# Patient Record
Sex: MALE | Race: WHITE | Employment: FULL TIME | URBAN - METROPOLITAN AREA
[De-identification: names, ages, dates, MRNs, and addresses within clinical notes are randomized per-mention and may not be internally consistent; named-entity substitution may affect disease eponyms.]

---

## 2017-04-20 ENCOUNTER — OFFICE VISIT (OUTPATIENT)
Dept: SURGERY | Facility: CLINIC | Age: 52
End: 2017-04-20

## 2017-04-20 VITALS
WEIGHT: 221.6 LBS | OXYGEN SATURATION: 96 % | BODY MASS INDEX: 32.82 KG/M2 | SYSTOLIC BLOOD PRESSURE: 129 MMHG | HEART RATE: 68 BPM | DIASTOLIC BLOOD PRESSURE: 86 MMHG | HEIGHT: 69 IN

## 2017-04-20 DIAGNOSIS — K62.82 ANAL DYSPLASIA: Primary | ICD-10-CM

## 2017-04-20 ASSESSMENT — PAIN SCALES - GENERAL: PAINLEVEL: NO PAIN (0)

## 2017-04-20 NOTE — LETTER
2017      RE: Gregorio Porter  [address]  [facility] [address]         Colon and Rectal Surgery Clinic High Resolution Anoscopy Note    RE: Gregorio Porter  : 1965  DARLENE: 2017      Gregorio Porter is a 51 year old HIV positive male with a history of ASCUS on anal cytology and HRA on 3/24/2016 showing AIN 1. Last anal cytology was 2015 and showed ASCUS. He presents today for repeat high resolution anoscopy.    HPI: Gregorio states that his HIV is under good control. He continues to smoke 2-3 packs per day but just recently talked to his PCP about getting patches and chantix. He plans on quitting now.   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 His last procedure was a few years ago and he had hemorrhoidectomy at the same time. He does frequently have bright red bleeding with bowel movements and attributes this to current hemorrhoids. He states that his bowel movements are variable with his HIV meds. He will have several days of constipation and then diarrhea for a day.   He had a colonoscopy in 2015 with the removal of 17 polyps at that time and a follow up colonoscopy with 2 additional polyps removed. His mother had colon cancer in her 60s and his father had ulcerative colitis with a colectomy at age 47. [facility] He was told that he is not due for a colonoscopy until 2018.    ASSESSMENT: Written, informed consent was obtained prior to procedure.  Prior to the start of the procedure and with procedural staff participation, I verbally confirmed the patient s identity using two indicators, relevant allergies, that the procedure was appropriate and matched the consent or emergent situation, and that the correct equipment/implants were available. Immediately prior to starting the procedure I conducted the Time Out with the procedural staff and  re-confirmed the patient s name, procedure, and site/side. (The Joint Commission universal protocol was followed.)  Yes    Sedation (Moderate or Deep): None    Anal cytology was obtained today. Digital anal rectal exam was performed without any lesions palpable. Dilute acetic acid soak was completed for 2 minutes. Lubricant was used to insert the anoscope. Performed high resolution microscopy using the colposcope. The dentate line was viewed in its entirety. Abnormal areas noted was one area of thickened tissue with bright acetowhitening in the left posterior position. After injection with 1% lidocaine with epinephrine, biopsy were obtained using a baby Tischler forceps at this site. Fulguration was not performed and hemostasis was obtained using Monsel solution. The remainder of the anal canal had some diffuse mild acetowhitening with some microvascular abnormalities but no coarse punctation. He additionally has a large, prolapsing hemorrhoid in the left lateral position.  The perianal area was inspected after acetic acid soak. The findings noted were scarring in the posterior midline and moderate external skin tags. There was an area of hyperpigmentation in the right lateral position.   There was a small distinct area of bright acetowhitening in the right lateral position and a small, raised mole on the right buttock with some punctation. After injection with 1% lidocaine with epinephrine, biopsies were obtained at both of these sites and they were fulgurated completely.  The patient tolerated the procedures well.    PLAN: Gregorio Porter is a pleasant 50 year old HIV positive male with a history of ASCUS on anal cytology and AIN 1. Will follow up with patient with results of anal cytology and pathology from today. If low grade dysplasia, repeat HRA in 6 months. If high grade dysplasia in the anal canal, would recommend HRA in the OR with repeat HRA in clinic 3 months following that. If high grade dysplasia  externally, both sites were destroyed completely with fulguration today.  Advised him to continue to follow with his colorectal surgeon for management of multiple polyps and I recommended a repeat colonoscopy this year. Also advised [facility] as he may respond well to hemorrhoid banding.  I congratulated him on planned smoking cessation.     For details of past medical history, surgical history, family history, medications, allergies, and review of systems, please see details below.    Medical history:  No past medical history on file.    Surgical history:  No past surgical history on file.    Family history:  No family history on file.    Medications:  Current Outpatient Prescriptions   Medication Sig Dispense Refill     Ritonavir (NORVIR PO) Take by mouth daily       loperamide (IMODIUM) 2 MG capsule Take 2 mg by mouth daily       HYDRALAZINE-HCTZ PO        RANITIDINE HCL PO        BUPROPION HCL PO        MIRTAZAPINE PO Take by mouth At Bedtime       ATAZANAVIR SULFATE PO        emtricitabine-tenofovir (TRUVADA) 200-300 MG per tablet Take 1 tablet by mouth daily       Allergies:  The patienthas No Known Allergies.    Social history:  Social History   Substance Use Topics     Smoking status: Current Every Day Smoker     Packs/day: 2.00     Types: Cigarettes     Smokeless tobacco: Not on file     Alcohol use Yes      Comment: 5-6 beers a week     Marital status: single.    Review of Systems:  There are no exam notes on file for this visit.     This procedure was performed under a collaborative agreement with Dr. Deep Rock MD, Chief of Colon and Rectal Surgery, [facility].    CELINE Valladares  Colon and Rectal Surgery  [facility]          GUICHO Valladares CNP

## 2017-04-20 NOTE — MR AVS SNAPSHOT
"              After Visit Summary   2017    Gregorio Porter    MRN: [id number]           Patient Information     Date Of Birth          1965        Visit Information        Provider Department      2017 3:00 PM Theresa Lu APRN [facility] Colon and Rectal Surgery        Today's Diagnoses     Anal dysplasia    -  1       Follow-ups after your visit        Who to contact     Please call your clinic at [phone] to:    Ask questions about your health    Make or cancel appointments    Discuss your medicines    Learn about your test results    Speak to your doctor   If you have compliments or concerns about an experience at your clinic, or if you wish to file a complaint, please contact [facility] Patient Relations at [phone] or email us at [email or web address]         Additional Information About Your Visit        MyChart Information     Motivappst is an electronic gateway that provides easy, online access to your medical records. With OneMorePallet, you can request a clinic appointment, read your test results, renew a prescription or communicate with your care team.     To sign up for Motivappst visit the website at [email or web address]   You will be asked to enter the access code listed below, as well as some personal information. Please follow the directions to create your username and password.     Your access code is: [email or web address]  Expires: 2017  6:30 AM     Your access code will  in 90 days. If you need help or a new code, please contact your [facility] Clinic or call [phone] for assistance.        Care EveryWhere ID     This is your Care EveryWhere ID. This could be used by other organizations to access your [facility] medical records  [id number]        Your Vitals Were     Pulse Height Pulse Oximetry BMI (Body Mass Index)          68 5' 9\" 96% 32.72 kg/m2         Blood Pressure from Last 3 " Encounters:   04/20/17 129/86   03/24/16 121/81   08/13/15 130/83    Weight from Last 3 Encounters:   04/20/17 221 lb 9.6 oz   03/24/16 235 lb 1.6 oz   08/13/15 236 lb 8 oz              We Performed the Following     Cytology non gyn (Anal PAP)     Surgical pathology exam        Primary Care Provider Office Phone # Fax #    Mike Rios [phone]       [facility] [address] [facility] [id number]        Thank you!     Thank you for choosing [facility] COLON AND RECTAL SURGERY  for your care. Our goal is always to provide you with excellent care. Hearing back from our patients is one way we can continue to improve our services. Please take a few minutes to complete the written survey that you may receive in the mail after your visit with us. Thank you!             Your Updated Medication List - Protect others around you: Learn how to safely use, store and throw away your medicines at www.disposemymeds.org.          This list is accurate as of: 4/20/17  4:00 PM.  Always use your most recent med list.                   Brand Name Dispense Instructions for use    ATAZANAVIR SULFATE PO          BUPROPION HCL PO          emtricitabine-tenofovir 200-300 MG per tablet    TRUVADA     Take 1 tablet by mouth daily       HYDRALAZINE-HCTZ PO          loperamide 2 MG capsule    IMODIUM     Take 2 mg by mouth daily       MIRTAZAPINE PO      Take by mouth At Bedtime       NORVIR PO      Take by mouth daily       RANITIDINE HCL PO

## 2017-04-20 NOTE — NURSING NOTE
"Chief Complaint   Patient presents with     Clinic Care Coordination - Follow-up     return       Vitals:    04/20/17 1456   BP: 129/86   Pulse: 68   SpO2: 96%   Weight: 221 lb 9.6 oz   Height: 5' 9\"       Body mass index is 32.72 kg/(m^2).    Karen SESAY LPN                          "

## 2017-04-20 NOTE — PROGRESS NOTES
Colon and Rectal Surgery Clinic High Resolution Anoscopy Note    RE: Gregorio Porter  : 1965  DARLENE: 2017      Gregorio Porter is a 51 year old HIV positive male with a history of ASCUS on anal cytology and HRA on 3/24/2016 showing AIN 1. Last anal cytology was 2015 and showed ASCUS. He presents today for repeat high resolution anoscopy.    HPI: Gregorio states that his HIV is under good control. He continues to smoke 2-3 packs per day but just recently talked to his PCP about getting patches and chantix. He plans on quitting now.   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 His last procedure was a few years ago and he had hemorrhoidectomy at the same time. He does frequently have bright red bleeding with bowel movements and attributes this to current hemorrhoids. He states that his bowel movements are variable with his HIV meds. He will have several days of constipation and then diarrhea for a day.   He had a colonoscopy in 2015 with the removal of 17 polyps at that time and a follow up colonoscopy with 2 additional polyps removed. His mother had colon cancer in her 60s and his father had ulcerative colitis with a colectomy at age 47. [facility] He was told that he is not due for a colonoscopy until 2018.    ASSESSMENT: Written, informed consent was obtained prior to procedure.  Prior to the start of the procedure and with procedural staff participation, I verbally confirmed the patient s identity using two indicators, relevant allergies, that the procedure was appropriate and matched the consent or emergent situation, and that the correct equipment/implants were available. Immediately prior to starting the procedure I conducted the Time Out with the procedural staff and re-confirmed the patient s name, procedure, and site/side. (The Joint Commission  universal protocol was followed.)  Yes    Sedation (Moderate or Deep): None    Anal cytology was obtained today. Digital anal rectal exam was performed without any lesions palpable. Dilute acetic acid soak was completed for 2 minutes. Lubricant was used to insert the anoscope. Performed high resolution microscopy using the colposcope. The dentate line was viewed in its entirety. Abnormal areas noted was one area of thickened tissue with bright acetowhitening in the left posterior position. After injection with 1% lidocaine with epinephrine, biopsy were obtained using a baby Tischler forceps at this site. Fulguration was not performed and hemostasis was obtained using Monsel solution. The remainder of the anal canal had some diffuse mild acetowhitening with some microvascular abnormalities but no coarse punctation. He additionally has a large, prolapsing hemorrhoid in the left lateral position.  The perianal area was inspected after acetic acid soak. The findings noted were scarring in the posterior midline and moderate external skin tags. There was an area of hyperpigmentation in the right lateral position.   There was a small distinct area of bright acetowhitening in the right lateral position and a small, raised mole on the right buttock with some punctation. After injection with 1% lidocaine with epinephrine, biopsies were obtained at both of these sites and they were fulgurated completely.  The patient tolerated the procedures well.    PLAN: Gregroio Porter is a pleasant 50 year old HIV positive male with a history of ASCUS on anal cytology and AIN 1. Will follow up with patient with results of anal cytology and pathology from today. If low grade dysplasia, repeat HRA in 6 months. If high grade dysplasia in the anal canal, would recommend HRA in the OR with repeat HRA in clinic 3 months following that. If high grade dysplasia externally, both sites were destroyed completely with fulguration today.  Advised him to  continue to follow with his colorectal surgeon for management of multiple polyps and I recommended a repeat colonoscopy this year. Also advised [facility] as he may respond well to hemorrhoid banding.  I congratulated him on planned smoking cessation.     For details of past medical history, surgical history, family history, medications, allergies, and review of systems, please see details below.    Medical history:  No past medical history on file.    Surgical history:  No past surgical history on file.    Family history:  No family history on file.    Medications:  Current Outpatient Prescriptions   Medication Sig Dispense Refill     Ritonavir (NORVIR PO) Take by mouth daily       loperamide (IMODIUM) 2 MG capsule Take 2 mg by mouth daily       HYDRALAZINE-HCTZ PO        RANITIDINE HCL PO        BUPROPION HCL PO        MIRTAZAPINE PO Take by mouth At Bedtime       ATAZANAVIR SULFATE PO        emtricitabine-tenofovir (TRUVADA) 200-300 MG per tablet Take 1 tablet by mouth daily       Allergies:  The patienthas No Known Allergies.    Social history:  Social History   Substance Use Topics     Smoking status: Current Every Day Smoker     Packs/day: 2.00     Types: Cigarettes     Smokeless tobacco: Not on file     Alcohol use Yes      Comment: 5-6 beers a week     Marital status: single.    Review of Systems:  There are no exam notes on file for this visit.     This procedure was performed under a collaborative agreement with Dr. Deep Rock MD, Chief of Colon and Rectal Surgery, [facility].    Theresa Brand NP-C  Colon and Rectal Surgery  [facility]

## 2017-04-21 LAB — COPATH REPORT: NORMAL

## 2017-04-24 ENCOUNTER — TELEPHONE (OUTPATIENT)
Dept: SURGERY | Facility: CLINIC | Age: 52
End: 2017-04-24

## 2017-04-24 LAB — COPATH REPORT: NORMAL

## 2017-04-24 NOTE — TELEPHONE ENCOUNTER
Called to discuss biopsy results showing AIN 1. Recommend repeat HRA in 6 months. Left message.    GUICHO Valladares, NP-C  Colon and Rectal Surgery  [facility]

## 2017-09-25 ENCOUNTER — TELEPHONE (OUTPATIENT)
Dept: SURGERY | Facility: CLINIC | Age: 52
End: 2017-09-25

## 2017-09-25 NOTE — TELEPHONE ENCOUNTER
Left VM for pt that I faxed a request last week to the [facility] an [facility] on [facility] for follow-up (anal microscopy) appcheryl w/ Theresa Brand (Colorectal). I will call him as soon as I get a new authorization and to call me if he hears from the [facility] also.   Makeda [phone]

## 2017-11-29 ENCOUNTER — TELEPHONE (OUTPATIENT)
Dept: SURGERY | Facility: CLINIC | Age: 52
End: 2017-11-29

## 2017-11-30 ENCOUNTER — OFFICE VISIT (OUTPATIENT)
Dept: SURGERY | Facility: CLINIC | Age: 52
End: 2017-11-30

## 2017-11-30 VITALS
TEMPERATURE: 98.3 F | OXYGEN SATURATION: 95 % | HEIGHT: 69 IN | DIASTOLIC BLOOD PRESSURE: 80 MMHG | HEART RATE: 72 BPM | SYSTOLIC BLOOD PRESSURE: 140 MMHG | BODY MASS INDEX: 34.52 KG/M2 | WEIGHT: 233.1 LBS

## 2017-11-30 DIAGNOSIS — K62.82 ANAL DYSPLASIA: Primary | ICD-10-CM

## 2017-11-30 RX ORDER — LORATADINE 10 MG/1
10 TABLET ORAL DAILY
COMMUNITY

## 2017-11-30 RX ORDER — CALCIUM CARBONATE 500 MG/1
1 TABLET, CHEWABLE ORAL DAILY
COMMUNITY

## 2017-11-30 ASSESSMENT — PAIN SCALES - GENERAL: PAINLEVEL: NO PAIN (0)

## 2017-11-30 NOTE — NURSING NOTE
"Chief Complaint   Patient presents with     Clinic Care Coordination - Follow-up     Patient here today for high resolution anoscopy.        Vitals:    11/30/17 1127   BP: 140/80   BP Location: Left arm   Patient Position: Chair   Cuff Size: Adult Large   Pulse: 72   Temp: 98.3  F (36.8  C)   TempSrc: Oral   SpO2: 95%   Weight: 233 lb 1.6 oz   Height: 5' 9\"       Body mass index is 34.42 kg/(m^2).    Belgica ROJO LPN                     "

## 2017-11-30 NOTE — TELEPHONE ENCOUNTER
Patient Telephone Reminder Call    Date of call:  11/29/17 at 6:29 PM  Phone numbers:  Home number on file [phone] (home)    Reached patient/confirmed appointment:  Yes  Appointment with: Theresa Narayan  Reason for visit:  Anal Microscopy exam

## 2017-11-30 NOTE — LETTER
2017       RE: Gregorio Porter  [address]  [facility] [address]     Dear Colleague,    Thank you for referring your patient, Gregorio Porter, to the [facility] COLON AND RECTAL SURGERY at [facility]. Please see a copy of my visit note below.      Colon and Rectal Surgery Clinic High Resolution Anoscopy Note    RE: Gregorio Porter  : 1965  DARLENE: 2017    Gregorio Porter is a 51 year old HIV positive male with a history of ASCUS on anal cytology and HRA with AIN 1. Last anal cytology was 17 and was normal. Last HRA was 17 with intra anal and perianal biopsies showing AIN 1. He presents today for repeat high resolution anoscopy.    HPI: Gregorio states that his HIV is under good control. He continues to smoke 2-3 packs per day and still wants to quit and has nicotine patches but is not actively trying to quit.    He continues to have bright red bleeding with bowel movements and attributes this to current hemorrhoids. He states that his bowel movements are variable with his HIV meds. He will have several days of constipation and then diarrhea for a day. He has not tried a fiber supplement yet. [facility] and reportedly had a hemorrhoidectomy several years ago.    He had a colonoscopy in 2015 with the removal of 17 polyps at that time and a follow up colonoscopy with 2 additional polyps removed. His mother had colon cancer in her 60s and his father had ulcerative colitis with a colectomy at age 47. He [facility]. He was told that he is not due for a colonoscopy until 2018.    ASSESSMENT: Written, informed consent was obtained prior to procedure.  Prior to the start of the procedure and with procedural staff participation, I verbally confirmed the patient s identity using two indicators, relevant allergies, that the procedure was appropriate and matched the consent or  emergent situation, and that the correct equipment/implants were available. Immediately prior to starting the procedure I conducted the Time Out with the procedural staff and re-confirmed the patient s name, procedure, and site/side. (The Joint Commission universal protocol was followed.)  Yes    Sedation (Moderate or Deep): None    Anal cytology was obtained today. Digital anal rectal exam was performed without any lesions palpable. Dilute acetic acid soak was completed for 2 minutes. Lubricant was used to insert the anoscope. Performed high resolution microscopy using the colposcope. The dentate line was viewed in its entirety. Mild acetowhitening without punctation but no bright white acetowhitening, punctation, verruciform lesions, or mosacism. Large hemorrhoid in the left posterior position with mild bleeding. Perianal skin was inspected after acetic acid soak. Some hyperpigmentation but no acetowhitening, punctation, or verruciform lesions. No biopsies obtained.    PLAN: Recommend repeat HRA in 6 months. Again strongly encouraged smoking cessation. Recommended starting on a daily fiber supplement for hemorrhoids and [facility]. Also continue to follow with his colorectal surgeon for management of multiple polyps and repeat colonoscopy.     For details of past medical history, surgical history, family history, medications, allergies, and review of systems, please see details below.    Medical history:  No past medical history on file.    Surgical history:  No past surgical history on file.    Family history:  No family history on file.    Medications:  Current Outpatient Prescriptions   Medication Sig Dispense Refill     Ritonavir (NORVIR PO) Take by mouth daily       loperamide (IMODIUM) 2 MG capsule Take 2 mg by mouth daily       HYDRALAZINE-HCTZ PO        RANITIDINE HCL PO        BUPROPION HCL PO        MIRTAZAPINE PO Take by mouth At Bedtime       ATAZANAVIR SULFATE  PO        emtricitabine-tenofovir (TRUVADA) 200-300 MG per tablet Take 1 tablet by mouth daily       Allergies:  The patienthas No Known Allergies.    Social history:  Social History   Substance Use Topics     Smoking status: Current Every Day Smoker     Packs/day: 2.00     Types: Cigarettes     Smokeless tobacco: Not on file     Alcohol use Yes      Comment: 5-6 beers a week     Marital status: single.    Review of Systems:  There are no exam notes on file for this visit.     This procedure was performed under a collaborative agreement with Dr. Deep Rock MD, Chief of Colon and Rectal Surgery, [facility].      Again, thank you for allowing me to participate in the care of your patient.      Sincerely,    GUICHO Howell CNP

## 2017-11-30 NOTE — MR AVS SNAPSHOT
"              After Visit Summary   2017    Gregorio Porter    MRN: [id number]           Patient Information     Date Of Birth          1965        Visit Information        Provider Department      2017 12:00 PM Theresa Lu APRN [facility] Colon and Rectal Surgery        Today's Diagnoses     Anal dysplasia    -  1       Follow-ups after your visit        Who to contact     Please call your clinic at [phone] to:    Ask questions about your health    Make or cancel appointments    Discuss your medicines    Learn about your test results    Speak to your doctor   If you have compliments or concerns about an experience at your clinic, or if you wish to file a complaint, please contact [facility] Patient Relations at [phone] or email us at [email or web address]         Additional Information About Your Visit        MyChart Information     Payofft is an electronic gateway that provides easy, online access to your medical records. With PredicSis, you can request a clinic appointment, read your test results, renew a prescription or communicate with your care team.     To sign up for Payofft visit the website at [email or web address]   You will be asked to enter the access code listed below, as well as some personal information. Please follow the directions to create your username and password.     Your access code is: [email or web address]  Expires: 2018  6:31 AM     Your access code will  in 90 days. If you need help or a new code, please contact your [facility] or call [phone] for assistance.        Care EveryWhere ID     This is your Care EveryWhere ID. This could be used by other organizations to access your [facility] medical records  [id number]        Your Vitals Were     Pulse Temperature Height Pulse Oximetry BMI (Body Mass Index)       72 98.3  F (36.8  C) (Oral) 5' 9\" 95% 34.42 kg/m2     "    Blood Pressure from Last 3 Encounters:   11/30/17 140/80   04/20/17 129/86   03/24/16 121/81    Weight from Last 3 Encounters:   11/30/17 233 lb 1.6 oz   04/20/17 221 lb 9.6 oz   03/24/16 235 lb 1.6 oz              We Performed the Following     HC ANOSCOPY DX, HRA, INCL JOAQUIN SPECIMEN, BRUSH/WASH        Primary Care Provider Office Phone # Fax #    Mike Rios [phone]       [facility] [address]        Equal Access to Services     DARCY KHAN : Hadii aad ku hadashbyron Soshanika, waaxda luqadaha, qaybta kaalmada jessica, robb yu. So [facility] [phone].    ATENCIÓN: Si habla español, tiene a flores disposición servicios gratuitos de asistencia lingüística. [facility] [phone].    We comply with applicable federal civil rights laws and [place] laws. We do not discriminate on the basis of race, color, national origin, age, disability, sex, sexual orientation, or gender identity.            Thank you!     Thank you for choosing [facility] COLON AND RECTAL SURGERY  for your care. Our goal is always to provide you with excellent care. Hearing back from our patients is one way we can continue to improve our services. Please take a few minutes to complete the written survey that you may receive in the mail after your visit with us. Thank you!             Your Updated Medication List - Protect others around you: Learn how to safely use, store and throw away your medicines at www.disposemymeds.org.          This list is accurate as of: 11/30/17 12:45 PM.  Always use your most recent med list.                   Brand Name Dispense Instructions for use Diagnosis    ATORVASTATIN CALCIUM PO           DAYQUIL OR           GENVOYA PO           LISINOPRIL PO           loperamide 2 MG capsule    IMODIUM     Take 2 mg by mouth daily    Anal dysplasia       loratadine 10 MG tablet    CLARITIN     Take 10 mg by mouth daily        MIRTAZAPINE PO       Take by mouth At Bedtime        RANITIDINE HCL PO           TUMS 500 MG chewable tablet   Generic drug:  calcium carbonate      Take 1 chew tab by mouth daily

## 2017-11-30 NOTE — PROGRESS NOTES
Colon and Rectal Surgery Clinic High Resolution Anoscopy Note    RE: Gregorio Porter  : 1965  DARLENE: 2017    Gregorio Porter is a 51 year old HIV positive male with a history of ASCUS on anal cytology and HRA with AIN 1. Last anal cytology was 17 and was normal. Last HRA was 17 with intra anal and perianal biopsies showing AIN 1. He presents today for repeat high resolution anoscopy.    HPI: Gregorio states that his HIV is under good control. He continues to smoke 2-3 packs per day and still wants to quit and has nicotine patches but is not actively trying to quit.    He continues to have bright red bleeding with bowel movements and attributes this to current hemorrhoids. He states that his bowel movements are variable with his HIV meds. He will have several days of constipation and then diarrhea for a day. He has not tried a fiber supplement yet. [facility] and [facility] had a hem[facility]idectomy several years ago.    He had a colonoscopy in 2015 with the removal of 17 polyps at that time and a follow up colonoscopy with 2 additional polyps removed. His mother had colon cancer in her 60s and his father had ulcerative colitis with a colectomy at age 47. [facility]. He was told that he is not due for a colonoscopy until 2018.    ASSESSMENT: Written, informed consent was obtained prior to procedure.  Prior to the start of the procedure and with procedural staff participation, I verbally confirmed the patient s identity using two indicators, relevant allergies, that the procedure was appropriate and matched the consent or emergent situation, and that the correct equipment/implants were available. Immediately prior to starting the procedure I conducted the Time Out with the procedural staff and re-confirmed the patient s name, procedure, and site/side. (The Joint Commission universal protocol was  followed.)  Yes    Sedation (Moderate or Deep): None    Anal cytology was obtained today. Digital anal rectal exam was performed without any lesions palpable. Dilute acetic acid soak was completed for 2 minutes. Lubricant was used to insert the anoscope. Performed high resolution microscopy using the colposcope. The dentate line was viewed in its entirety. Mild acetowhitening without punctation but no bright white acetowhitening, punctation, verruciform lesions, or mosacism. Large hemorrhoid in the left posterior position with mild bleeding. Perianal skin was inspected after acetic acid soak. Some hyperpigmentation but no acetowhitening, punctation, or verruciform lesions. No biopsies obtained.    PLAN: Recommend repeat HRA in 6 months. Again strongly encouraged smoking cessation. Recommended starting on a daily fiber supplement for hemorrhoids and [facility]. Also continue to follow with his colorectal surgeon for management of multiple polyps and repeat colonoscopy.     For details of past medical history, surgical history, family history, medications, allergies, and review of systems, please see details below.    Medical history:  No past medical history on file.    Surgical history:  No past surgical history on file.    Family history:  No family history on file.    Medications:  Current Outpatient Prescriptions   Medication Sig Dispense Refill     Ritonavir (NORVIR PO) Take by mouth daily       loperamide (IMODIUM) 2 MG capsule Take 2 mg by mouth daily       HYDRALAZINE-HCTZ PO        RANITIDINE HCL PO        BUPROPION HCL PO        MIRTAZAPINE PO Take by mouth At Bedtime       ATAZANAVIR SULFATE PO        emtricitabine-tenofovir (TRUVADA) 200-300 MG per tablet Take 1 tablet by mouth daily       Allergies:  The patienthas No Known Allergies.    Social history:  Social History   Substance Use Topics     Smoking status: Current Every Day Smoker     Packs/day: 2.00      Types: Cigarettes     Smokeless tobacco: Not on file     Alcohol use Yes      Comment: 5-6 beers a week     Marital status: single.    Review of Systems:  There are no exam notes on file for this visit.     This procedure was performed under a collaborative agreement with Dr. Deep Rock MD, Chief of Colon and Rectal Surgery, [facility].    Theresa Brand NP-C  Colon and Rectal Surgery  [facility]

## 2018-05-17 ENCOUNTER — TELEPHONE (OUTPATIENT)
Dept: SURGERY | Facility: CLINIC | Age: 53
End: 2018-05-17

## 2018-05-17 NOTE — TELEPHONE ENCOUNTER
5/17/18  Left a voicemail for patient that I faxed a request to [facility] for authorization for follow-up (anal microscopy) appt w/ Tehresa Brand (Colorectal).    Attn: [facility] Dennise fax [phone], phone [phone].  Will call him again when I receive the authorization.  Makeda [phone]

## 2019-06-18 ENCOUNTER — TELEPHONE (OUTPATIENT)
Dept: SURGERY | Facility: CLINIC | Age: 54
End: 2019-06-18

## 2019-06-18 NOTE — TELEPHONE ENCOUNTER
"[facility] Call Center    Phone Message    May a detailed message be left on voicemail: no    Reason for Call: Other: Lucy from [facility] calling with a referral from Dr. Paty Ames for \"atypical squamous cells of undetermined significance on cyclogical (pap?) smear of anus\". Records and referral are being faxed to clinic. Lucy would like to be notified of the date and time the pt is to be scheduled, so they can assure coverage for insurance. Please advise.    Action Taken: Message routed to:  [facility] ([facility] Surg  "

## 2019-06-25 NOTE — TELEPHONE ENCOUNTER
Left VM for pt to return my call to schedule follow-up (anal microscopy) appt w/ Theresa Brand (Colorectal) [facility].  Makeda [phone]

## 2019-08-08 ENCOUNTER — OFFICE VISIT (OUTPATIENT)
Dept: SURGERY | Facility: CLINIC | Age: 54
End: 2019-08-08
Payer: COMMERCIAL

## 2019-08-08 VITALS
DIASTOLIC BLOOD PRESSURE: 89 MMHG | HEIGHT: 69 IN | SYSTOLIC BLOOD PRESSURE: 151 MMHG | HEART RATE: 62 BPM | WEIGHT: 232.4 LBS | OXYGEN SATURATION: 96 % | BODY MASS INDEX: 34.42 KG/M2

## 2019-08-08 DIAGNOSIS — K62.82 ANAL DYSPLASIA: Primary | ICD-10-CM

## 2019-08-08 ASSESSMENT — MIFFLIN-ST. JEOR: SCORE: 1879.54

## 2019-08-08 ASSESSMENT — PAIN SCALES - GENERAL: PAINLEVEL: NO PAIN (0)

## 2019-08-08 NOTE — LETTER
2019       RE: Gregorio Porter  [address]  [place] [address]     Dear Colleague,    Thank you for referring your patient, Gregorio Porter, to the [facility] COLON AND RECTAL SURGERY at [facility]. Please see a copy of my visit note below.    Colon and Rectal Surgery Clinic High Resolution Anoscopy Note    RE: Gregorio Porter  : 1965  DARLENE: 2019    Gregorio Porter is a 51 year old HIV positive male with a history of ASCUS on anal cytology and HRA with AIN 1. Last HRA was 2017 with no evidence of high grade dysplasia. Last anal cytology 17 was negative.     HPI: Gregorio states that his HIV is under good control. He continues to smoke 2-3 packs per day and has tried quitting but has not been successful. He is not currently trying to quit.  He continues to have issues with diarrhea and constipation. With diarrhea has has had leakage of stool. He then takes imodium but gets constipated for several days. He has tried Fibercon and Citrucel tablets in the past without improvement. He reportedly had a hemorrhoidectomy several years ago.    His mother had colon cancer in her 60s and his father had ulcerative colitis with a colectomy at age 47. [facility] which was [facility].    ASSESSMENT: Written, informed consent was obtained prior to procedure.  Prior to the start of the procedure and with procedural staff participation, I verbally confirmed the patient s identity using two indicators, relevant allergies, that the procedure was appropriate and matched the consent or emergent situation, and that the correct equipment/implants were available. Immediately prior to starting the procedure I conducted the Time Out with the procedural staff and re-confirmed the patient s name, procedure, and site/side. (The Joint Commission universal protocol was followed.)   Yes    Sedation (Moderate or Deep): None    Anal cytology was obtained today using a Dacron swab. Digital anal rectal exam was performed without any lesions palpable. Dilute acetic acid soak was completed for 2 minutes. Lubricant was used to insert the anoscope. Performed high resolution microscopy using the colposcope. The dentate line was viewed in its entirety. Mild acetowhitening without punctation but no bright white acetowhitening, punctation, verruciform lesions, or mosacism. Large hemorrhoid in the left posterior position without bleeding. Perianal skin was inspected after acetic acid soak. Some hyperpigmentation but and acetowhitening without punctation, or verruciform lesions. No biopsies obtained.    PLAN: Recommend repeat HRA in 6 months. Again strongly encouraged smoking cessation. Recommended starting on a daily powder fiber supplement and increase this slowly up to three times a day. Patient's questions were answered to his stated satisfaction and he is in agreement with this plan.    For details of past medical history, surgical history, family history, medications, allergies, and review of systems, please see details below.    Medical history:  No past medical history on file.    Surgical history:  No past surgical history on file.    Family history:  No family history on file.    Medications:  Current Outpatient Medications   Medication Sig Dispense Refill     ATORVASTATIN CALCIUM PO        calcium carbonate (TUMS) 500 MG chewable tablet Take 1 chew tab by mouth daily       Kqdgwri-Hfvkh-Lkmhwqzj-TenofAF (GENVOYA PO)        LISINOPRIL PO        loperamide (IMODIUM) 2 MG capsule Take 2 mg by mouth daily       loratadine (CLARITIN) 10 MG tablet Take 10 mg by mouth daily       MIRTAZAPINE PO Take by mouth At Bedtime       Pseudoephedrine-APAP-DM (DAYQUIL OR)        RANITIDINE HCL PO        Allergies:  The patienthas No Known Allergies.    Social history:  Social History     Tobacco Use     Smoking  status: Current Every Day Smoker     Packs/day: 2.00     Types: Cigarettes     Smokeless tobacco: Never Used   Substance Use Topics     Alcohol use: Yes     Comment: 5-6 beers a week     Marital status: single.    Review of Systems:  There are no exam notes on file for this visit.     This procedure was performed under a collaborative agreement with Dr. Deep Rock MD, Chief of Colon and Rectal Surgery, [facility].    Theresa Brand NP-C  Colon and Rectal Surgery  [facility]

## 2019-08-08 NOTE — PATIENT INSTRUCTIONS
1. Start a daily fiber supplement such as Citrucel powder. Start with once a day and slowly increase up to three times a day, if needed, over the next 4-6 weeks  2. Return to clinic in 6 months for repeat high resolution anoscopy

## 2019-08-08 NOTE — LETTER
August 8, 2019    RE:  Gregorio Porter                              [address]  [facility] [address]      To Whom It May Concern:    This letter is written to document that Gregorio Porter was seen today, 8/8/2019, by Theresa RiceC at the [facility] outpatient clinics.      Please take the above information into consideration. If there are questions or concerns please contact the Colon and Rectal Surgery Clinic at [phone].    Sincerely,    Theresa Narayan, CASSY-C        Division of Colon and Rectal Surgery  Department of Surgery      Alexis Otto, EMT  Colon and Rectal Surgery Nurse Coordinator  [facility]

## 2019-08-08 NOTE — PROGRESS NOTES
Colon and Rectal Surgery Clinic High Resolution Anoscopy Note    RE: Gregorio Porter  : 1965  DARLENE: 2019    Gregorio Porter is a 51 year old HIV positive male with a history of ASCUS on anal cytology and HRA with AIN 1. Last HRA was 2017 with no evidence of high grade dysplasia. Last anal cytology 17 was negative.     HPI: Gregorio states that his HIV is under good control. He continues to smoke 2-3 packs per day and has tried quitting but has not been successful. He is not currently trying to quit.  He continues to have issues with diarrhea and constipation. With diarrhea has has had leakage of stool. He then takes imodium but gets constipated for several days. He has tried Fibercon and Citrucel tablets in the past without improvement. He reportedly had a hemorrhoidectomy several years ago.    His mother had colon cancer in her 60s and his father had ulcerative colitis with a colectomy at age 47. [facility] which was [facility].    ASSESSMENT: Written, informed consent was obtained prior to procedure.  Prior to the start of the procedure and with procedural staff participation, I verbally confirmed the patient s identity using two indicators, relevant allergies, that the procedure was appropriate and matched the consent or emergent situation, and that the correct equipment/implants were available. Immediately prior to starting the procedure I conducted the Time Out with the procedural staff and re-confirmed the patient s name, procedure, and site/side. (The Joint Commission universal protocol was followed.)  Yes    Sedation (Moderate or Deep): None    Anal cytology was obtained today using a Dacron swab. Digital anal rectal exam was performed without any lesions palpable. Dilute acetic acid soak was completed for 2 minutes. Lubricant was used to insert the anoscope. Performed high resolution  microscopy using the colposcope. The dentate line was viewed in its entirety. Mild acetowhitening without punctation but no bright white acetowhitening, punctation, verruciform lesions, or mosacism. Large hemorrhoid in the left posterior position without bleeding. Perianal skin was inspected after acetic acid soak. Some hyperpigmentation but and acetowhitening without punctation, or verruciform lesions. No biopsies obtained.    PLAN: Recommend repeat HRA in 6 months. Again strongly encouraged smoking cessation. Recommended starting on a daily powder fiber supplement and increase this slowly up to three times a day. Patient's questions were answered to his stated satisfaction and he is in agreement with this plan.    For details of past medical history, surgical history, family history, medications, allergies, and review of systems, please see details below.    Medical history:  No past medical history on file.    Surgical history:  No past surgical history on file.    Family history:  No family history on file.    Medications:  Current Outpatient Medications   Medication Sig Dispense Refill     ATORVASTATIN CALCIUM PO        calcium carbonate (TUMS) 500 MG chewable tablet Take 1 chew tab by mouth daily       Svembfo-Kdewu-Revlzmiy-TenofAF (GENVOYA PO)        LISINOPRIL PO        loperamide (IMODIUM) 2 MG capsule Take 2 mg by mouth daily       loratadine (CLARITIN) 10 MG tablet Take 10 mg by mouth daily       MIRTAZAPINE PO Take by mouth At Bedtime       Pseudoephedrine-APAP-DM (DAYQUIL OR)        RANITIDINE HCL PO        Allergies:  The patienthas No Known Allergies.    Social history:  Social History     Tobacco Use     Smoking status: Current Every Day Smoker     Packs/day: 2.00     Types: Cigarettes     Smokeless tobacco: Never Used   Substance Use Topics     Alcohol use: Yes     Comment: 5-6 beers a week     Marital status: single.    Review of Systems:  There are no exam notes on file for this visit.     This  procedure was performed under a collaborative agreement with Dr. Deep Rock MD, Chief of Colon and Rectal Surgery, [facility].    GAVIN ValladaresC  Colon and Rectal Surgery  [facility]

## 2019-08-08 NOTE — NURSING NOTE
"Chief Complaint   Patient presents with     High Resolution Anoscopy       Vitals:    08/08/19 1250   BP: (!) 151/89   BP Location: Left arm   Patient Position: Sitting   Cuff Size: Adult Regular   Pulse: 62   SpO2: 96%   Weight: 232 lb 6.4 oz   Height: 5' 9\"       Body mass index is 34.32 kg/m .      Alexis Otto, EMT                      "

## 2019-08-09 LAB — COPATH REPORT: NORMAL

## 2021-10-12 ENCOUNTER — PATIENT OUTREACH (OUTPATIENT)
Dept: SURGERY | Facility: CLINIC | Age: 56
End: 2021-10-12

## 2022-04-29 ENCOUNTER — TRANSCRIBE ORDERS (OUTPATIENT)
Dept: OTHER | Age: 57
End: 2022-04-29

## 2022-04-29 DIAGNOSIS — K62.82 DYSPLASIA OF ANUS: Primary | ICD-10-CM

## 2022-05-06 ENCOUNTER — TELEPHONE (OUTPATIENT)
Dept: SURGERY | Facility: CLINIC | Age: 57
End: 2022-05-06

## 2022-05-06 NOTE — TELEPHONE ENCOUNTER
LVM with pod number for scheduling [facility] For anoscopy, schedule ump micro with Theresa Lu NP next available.

## 2022-07-15 ENCOUNTER — OFFICE VISIT (OUTPATIENT)
Dept: SURGERY | Facility: CLINIC | Age: 57
End: 2022-07-15
Attending: FAMILY MEDICINE
Payer: COMMERCIAL

## 2022-07-15 VITALS
SYSTOLIC BLOOD PRESSURE: 178 MMHG | BODY MASS INDEX: 35.81 KG/M2 | DIASTOLIC BLOOD PRESSURE: 96 MMHG | HEIGHT: 69 IN | WEIGHT: 241.8 LBS | OXYGEN SATURATION: 97 % | HEART RATE: 57 BPM

## 2022-07-15 DIAGNOSIS — K62.82 DYSPLASIA OF ANUS: ICD-10-CM

## 2022-07-15 DIAGNOSIS — K62.82 ANAL DYSPLASIA: Primary | ICD-10-CM

## 2022-07-15 LAB
LAB DIRECTOR COMMENTS: NORMAL
LAB DIRECTOR DISCLAIMER: NORMAL
LAB DIRECTOR INTERPRETATION: NORMAL
LAB DIRECTOR METHODOLOGY: NORMAL
LAB DIRECTOR RESULTS: NORMAL
SPECIMEN DESCRIPTION: NORMAL

## 2022-07-15 PROCEDURE — 88305 TISSUE EXAM BY PATHOLOGIST: CPT | Mod: 26 | Performed by: PATHOLOGY

## 2022-07-15 PROCEDURE — 99203 OFFICE O/P NEW LOW 30 MIN: CPT | Mod: 25 | Performed by: NURSE PRACTITIONER

## 2022-07-15 PROCEDURE — G0452 MOLECULAR PATHOLOGY INTERPR: HCPCS | Mod: 26 | Performed by: PATHOLOGY

## 2022-07-15 PROCEDURE — 46607 DIAGNOSTIC ANOSCOPY & BIOPSY: CPT | Performed by: NURSE PRACTITIONER

## 2022-07-15 PROCEDURE — 88112 CYTOPATH CELL ENHANCE TECH: CPT | Mod: 26 | Performed by: PATHOLOGY

## 2022-07-15 PROCEDURE — 99207 ZZHC ANOSCOPY DX, HRA W/ BIOPSY(IES): CPT | Performed by: NURSE PRACTITIONER

## 2022-07-15 PROCEDURE — 87624 HPV HI-RISK TYP POOLED RSLT: CPT | Performed by: NURSE PRACTITIONER

## 2022-07-15 PROCEDURE — 88112 CYTOPATH CELL ENHANCE TECH: CPT | Mod: TC | Performed by: NURSE PRACTITIONER

## 2022-07-15 PROCEDURE — 88305 TISSUE EXAM BY PATHOLOGIST: CPT | Mod: TC | Performed by: NURSE PRACTITIONER

## 2022-07-15 RX ORDER — LIDOCAINE HYDROCHLORIDE AND EPINEPHRINE 10; 10 MG/ML; UG/ML
6 INJECTION, SOLUTION INFILTRATION; PERINEURAL ONCE
Status: COMPLETED | OUTPATIENT
Start: 2022-07-15 | End: 2022-07-15

## 2022-07-15 RX ADMIN — LIDOCAINE HYDROCHLORIDE AND EPINEPHRINE 10 ML: 10; 10 INJECTION, SOLUTION INFILTRATION; PERINEURAL at 11:58

## 2022-07-15 ASSESSMENT — PAIN SCALES - GENERAL: PAINLEVEL: NO PAIN (0)

## 2022-07-15 NOTE — PATIENT INSTRUCTIONS
Start a daily fiber supplement such as Citrucel or Metamucil. Start with once a day and slowly increase up to three times a day, if needed, over the next 4-6 weeks for diarrhea  Follow up with primary care provider for colonoscopy  Will follow up with biopsy results for follow up high resolution anoscopy plan

## 2022-07-15 NOTE — NURSING NOTE
"Chief Complaint   Patient presents with     Follow Up     HRA       Vitals:    07/15/22 1125   BP: (!) 178/96   BP Location: Left arm   Patient Position: Sitting   Cuff Size: Adult Regular   Pulse: 57   SpO2: 97%   Weight: 241 lb 12.8 oz   Height: 5' 9\"       Body mass index is 35.71 kg/m .    Ute Huff CMA    "

## 2022-07-15 NOTE — PROGRESS NOTES
Colon and Rectal Surgery Clinic High Resolution Anoscopy Note    RE: Gregorio Porter  : 1965  DARLENE: 7/15/22    Gregorio Porter is a 57 year old HIV positive male with a history of ASCUS on anal cytology and HRA with AIN 1. Last HRA was 2019 with no evidence of high grade dysplasia and anal cytology at that time was negative.     HPI: Gregorio states that his HIV is under good control. He quit smoking 2 months ago.  He continues to have issues with diarrhea and constipation. With diarrhea has has had leakage of stool. He then takes imodium but gets constipated for several days. He has tried Fibercon and Citrucel tablets in the past without improvement. He reportedly had a hemorrhoidectomy several years ago.    His mother had colon cancer in her 60s and his father had ulcerative colitis with a colectomy at age 47. [facility] He had a colonoscopy last in 2018 reportedly and had multiple polyps. He thinks he is due for another colonoscopy.    ASSESSMENT: Written, informed consent was obtained prior to procedure.  Prior to the start of the procedure and with procedural staff participation, I verbally confirmed the patient s identity using two indicators, relevant allergies, that the procedure was appropriate and matched the consent or emergent situation, and that the correct equipment/implants were available. Immediately prior to starting the procedure I conducted the Time Out with the procedural staff and re-confirmed the patient s name, procedure, and site/side. (The Joint Commission universal protocol was followed.)  Yes    Sedation (Moderate or Deep): None    Anal cytology was obtained today using a Dacron swab. Digital anal rectal exam was performed without any lesions palpable. Dilute acetic acid soak was completed for 2 minutes. Lubricant was used to insert the anoscope. Performed high resolution microscopy using the colposcope. The dentate line was  viewed in its entirety. Mild acetowhitening without punctation but no bright white acetowhitening, punctation, verruciform lesions, or mosacism. Large hemorrhoid in the left posterior position without bleeding. Perianal skin was inspected after acetic acid soak. Some hyperpigmentation but and acetowhitening without punctation, or verruciform lesions. No biopsies obtained.    PLAN: We will follow-up with results of biopsy from today.  If low-grade dysplasia or normal, repeat in 1 year.  If high-grade dysplasia, would recommend fulguration in the operating room.  He does have some bleeding hemorrhoids and I recommended [facility].  Recommended he take a powder fiber supplements and can titrate this up to 3 times a day if needed for his diarrhea.  He has tried Imodium in the past but this causes constipation with even 1 Imodium tablet.  He believes he is due for a colonoscopy and will [facility] out to [facility] for [facility] of [facility].  Patient's questions were answered to his stated satisfaction and he is in agreement with this plan.    For details of past medical history, surgical history, family history, medications, allergies, and review of systems, please see details below.    Medical history:  No past medical history on file.    Surgical history:  No past surgical history on file.    Family history:  No family history on file.    Medications:  Current Outpatient Medications   Medication Sig Dispense Refill     ATORVASTATIN CALCIUM PO        calcium carbonate (TUMS) 500 MG chewable tablet Take 1 chew tab by mouth daily       Mkycbcv-Mvzpv-Kwgvvtwm-TenofAF (GENVOYA PO)        LISINOPRIL PO        loperamide (IMODIUM) 2 MG capsule Take 2 mg by mouth daily       loratadine (CLARITIN) 10 MG tablet Take 10 mg by mouth daily       MIRTAZAPINE PO Take by mouth At Bedtime       Pseudoephedrine-APAP-DM (DAYQUIL OR)        RANITIDINE HCL PO        Allergies:  The patienthas No Known  Allergies.    Social history:  Social History     Tobacco Use     Smoking status: Current Every Day Smoker     Packs/day: 2.00     Types: Cigarettes     Start date: 8/8/1984     Smokeless tobacco: Never Used   Substance Use Topics     Alcohol use: Yes     Comment: 5-6 beers a week     Marital status: single.    Review of Systems:  There are no exam notes on file for this visit.     This procedure was performed under a collaborative agreement with Dr. Carlos Enrique Davis MD, Chief of Colon and Rectal Surgery, [facility].    Theresa Brand NP-C  Colon and Rectal Surgery  [facility]

## 2022-07-18 LAB
PATH REPORT.COMMENTS IMP SPEC: NORMAL
PATH REPORT.FINAL DX SPEC: NORMAL
PATH REPORT.FINAL DX SPEC: NORMAL
PATH REPORT.GROSS SPEC: NORMAL
PATH REPORT.GROSS SPEC: NORMAL
PATH REPORT.MICROSCOPIC SPEC OTHER STN: NORMAL
PATH REPORT.RELEVANT HX SPEC: NORMAL
PATH REPORT.RELEVANT HX SPEC: NORMAL
PHOTO IMAGE: NORMAL

## 2023-04-27 ENCOUNTER — TELEPHONE (OUTPATIENT)
Dept: SURGERY | Facility: CLINIC | Age: 58
End: 2023-04-27

## 2023-04-27 NOTE — TELEPHONE ENCOUNTER
LVM to schedule:  With: Theresa Lu NP   Referring Provider: self   For / Appt Notes: micro   Appt Type: Microscopy   Appt Date/Time: due July 2023

## 2023-08-18 ENCOUNTER — TELEPHONE (OUTPATIENT)
Dept: SURGERY | Facility: CLINIC | Age: 58
End: 2023-08-18

## 2023-09-06 ENCOUNTER — MEDICAL CORRESPONDENCE (OUTPATIENT)
Dept: HEALTH INFORMATION MANAGEMENT | Facility: CLINIC | Age: 58
End: 2023-09-06

## 2023-09-08 ENCOUNTER — TRANSCRIBE ORDERS (OUTPATIENT)
Dept: OTHER | Age: 58
End: 2023-09-08

## 2023-09-08 DIAGNOSIS — K62.82 DYSPLASIA OF ANUS: Primary | ICD-10-CM

## 2023-09-11 ENCOUNTER — TRANSCRIBE ORDERS (OUTPATIENT)
Dept: OTHER | Age: 58
End: 2023-09-11

## 2023-09-11 DIAGNOSIS — K62.82 DYSPLASIA OF ANUS: Primary | ICD-10-CM

## 2023-09-13 ENCOUNTER — TELEPHONE (OUTPATIENT)
Dept: SURGERY | Facility: CLINIC | Age: 58
End: 2023-09-13

## 2023-11-24 ENCOUNTER — OFFICE VISIT (OUTPATIENT)
Dept: SURGERY | Facility: CLINIC | Age: 58
End: 2023-11-24
Payer: COMMERCIAL

## 2023-11-24 DIAGNOSIS — K62.5 RECTAL BLEEDING: ICD-10-CM

## 2023-11-24 DIAGNOSIS — K59.09 OTHER CONSTIPATION: ICD-10-CM

## 2023-11-24 DIAGNOSIS — K62.82 ANAL DYSPLASIA: Primary | ICD-10-CM

## 2023-11-24 PROCEDURE — 87624 HPV HI-RISK TYP POOLED RSLT: CPT | Performed by: NURSE PRACTITIONER

## 2023-11-24 PROCEDURE — 99213 OFFICE O/P EST LOW 20 MIN: CPT | Mod: 25 | Performed by: NURSE PRACTITIONER

## 2023-11-24 PROCEDURE — 88112 CYTOPATH CELL ENHANCE TECH: CPT | Mod: 26 | Performed by: PATHOLOGY

## 2023-11-24 PROCEDURE — G0452 MOLECULAR PATHOLOGY INTERPR: HCPCS | Mod: 26 | Performed by: STUDENT IN AN ORGANIZED HEALTH CARE EDUCATION/TRAINING PROGRAM

## 2023-11-24 PROCEDURE — 88112 CYTOPATH CELL ENHANCE TECH: CPT | Mod: TC | Performed by: NURSE PRACTITIONER

## 2023-11-24 PROCEDURE — 46601 DIAGNOSTIC ANOSCOPY: CPT | Performed by: NURSE PRACTITIONER

## 2023-11-24 RX ORDER — LIDOCAINE HYDROCHLORIDE 20 MG/ML
JELLY TOPICAL ONCE
Status: COMPLETED | OUTPATIENT
Start: 2023-11-24 | End: 2023-11-24

## 2023-11-24 RX ADMIN — LIDOCAINE HYDROCHLORIDE: 20 JELLY TOPICAL at 13:22

## 2023-11-24 NOTE — LETTER
2023       RE: Gregorio Porter  [address] [facility] [address]     Dear Colleague,    Thank you for referring your patient, Gregorio Porter, to the [facility] [place] [facility]. Please see a copy of my visit note below.    Colon and Rectal Surgery Clinic High Resolution Anoscopy Note    RE: Gregorio Porter  : 1965  DARLENE: 23    Gregorio Porter is a 58 year old HIV positive male with a history of ASCUS on anal cytology and HRA with AIN 1. Last HRA was 7/15/22 with biopsy showing AIN 1. Pap at that time was negative with no HPV.    HPI: Gregorio states that his HIV is under good control. He quit smoking but started up again in April due to a stressful life situation where he is going to need to move and may not be able to take his dog with him.  He used to have diarrhea but is now having constipation. Constipation has been so severe that he has had to manually disimpact. He strained with a bowel movement and had blood on the outside of his stool and reports having dark spots in his semen also.  He reportedly had a hemorrhoidectomy several years ago.  His mother had colon cancer in her 60s and his father had ulcerative colitis with a colectomy at age 47. [facility]. He had a colonoscopy last in  reportedly and had multiple polyps. He thinks he is due for another colonoscopy.    ASSESSMENT: Written, informed consent was obtained prior to procedure.  Prior to the start of the procedure and with procedural staff participation, I verbally confirmed the patient s identity using two indicators, relevant allergies, that the procedure was appropriate and matched the consent or emergent situation, and that the correct equipment/implants were available. Immediately prior to starting the procedure I conducted the Time Out with the procedural staff and re-confirmed  the patient s name, procedure, and site/side. (The Joint Commission universal protocol was followed.)  Yes    Sedation (Moderate or Deep): None    Anal cytology was obtained today using a Dacron swab. Digital anal rectal exam was performed without any lesions palpable but with bright red blood present. Dilute acetic acid soak was completed for 2 minutes. Lubricant was used to insert the anoscope. Performed high resolution microscopy using the colposcope. The dentate line was viewed in its entirety. Mild acetowhitening without punctation but no bright white acetowhitening, punctation, verruciform lesions, or mosacism. Large hemorrhoid in the left posterior position without bleeding. Perianal skin was inspected after acetic acid soak. Some hyperpigmentation but and acetowhitening without punctation, or verruciform lesions. No biopsies obtained.    PLAN: No evidence of high grade dysplasia on exam today. Will follow up with results of HPV genotyping for follow up plan. I strongly recommended colonoscopy given his rectal bleeding and family history. Strongly advised smoking cessation. Advised a daily fiber supplement and laxative in addition as needed for constipation.    For details of past medical history, surgical history, family history, medications, allergies, and review of systems, please see details below.    Medical history:  No past medical history on file.    Surgical history:  No past surgical history on file.    Family history:  No family history on file.    Medications:  Current Outpatient Medications   Medication Sig Dispense Refill    ATORVASTATIN CALCIUM PO       calcium carbonate (TUMS) 500 MG chewable tablet Take 1 chew tab by mouth daily      Euzjdfo-Wcslx-Ridomlcd-TenofAF (GENVOYA PO)       LISINOPRIL PO       loperamide (IMODIUM) 2 MG capsule Take 2 mg by mouth daily      loratadine (CLARITIN) 10 MG tablet Take 10 mg by mouth daily      MIRTAZAPINE PO Take by mouth At Bedtime       Pseudoephedrine-APAP-DM (DAYQUIL OR)       RANITIDINE HCL PO        Allergies:  The patientis allergic to wound dressings and tegaderm alginate ag rope.    Social history:  Social History     Tobacco Use    Smoking status: Former     Packs/day: 2     Types: Cigarettes     Start date: 1984     Quit date: 2022     Years since quittin.5    Smokeless tobacco: Never   Substance Use Topics    Alcohol use: Yes     Comment: 5-6 beers a week     Marital status: single.    Review of Systems:  There are no exam notes on file for this visit.     This procedure was performed under a collaborative agreement with Dr. Carlos Enrique Davis MD, Chief of Colon and Rectal Surgery, [facility].        Again, thank you for allowing me to participate in the care of your patient.      Sincerely,    GUICHO Howell CNP

## 2023-11-24 NOTE — PROGRESS NOTES
Colon and Rectal Surgery Clinic High Resolution Anoscopy Note    RE: Gregorio Porter  : 1965  DARLENE: 23    Gregorio Porter is a 58 year old HIV positive male with a history of ASCUS on anal cytology and HRA with AIN 1. Last HRA was 7/15/22 with biopsy showing AIN 1. Pap at that time was negative with no HPV.    HPI: Gregorio states that his HIV is under good control. He quit smoking but started up again in April due to a stressful life situation where he is going to need to move and may not be able to take his dog with him.  He used to have diarrhea but is now having constipation. Constipation has been so severe that he has had to manually disimpact. He strained with a bowel movement and had blood on the outside of his stool and reports having dark spots in his semen also.  He reportedly had a hemorrhoidectomy several years ago.  His mother had colon cancer in her 60s and his father had ulcerative colitis with a colectomy at age 47. [facility] He had a colonoscopy last in  reportedly and had multiple polyps. He thinks he is due for another colonoscopy.    ASSESSMENT: Written, informed consent was obtained prior to procedure.  Prior to the start of the procedure and with procedural staff participation, I verbally confirmed the patient s identity using two indicators, relevant allergies, that the procedure was appropriate and matched the consent or emergent situation, and that the correct equipment/implants were available. Immediately prior to starting the procedure I conducted the Time Out with the procedural staff and re-confirmed the patient s name, procedure, and site/side. (The Joint Commission universal protocol was followed.)  Yes    Sedation (Moderate or Deep): None    Anal cytology was obtained today using a Dacron swab. Digital anal rectal exam was performed without any lesions palpable but with bright red blood present. Dilute acetic acid soak  was completed for 2 minutes. Lubricant was used to insert the anoscope. Performed high resolution microscopy using the colposcope. The dentate line was viewed in its entirety. Mild acetowhitening without punctation but no bright white acetowhitening, punctation, verruciform lesions, or mosacism. Large hemorrhoid in the left posterior position without bleeding. Perianal skin was inspected after acetic acid soak. Some hyperpigmentation but and acetowhitening without punctation, or verruciform lesions. No biopsies obtained.    PLAN: No evidence of high grade dysplasia on exam today. Will follow up with results of HPV genotyping for follow up plan. I strongly recommended colonoscopy given his rectal bleeding and family history. Strongly advised smoking cessation. Advised a daily fiber supplement and laxative in addition as needed for constipation.    For details of past medical history, surgical history, family history, medications, allergies, and review of systems, please see details below.    Medical history:  No past medical history on file.    Surgical history:  No past surgical history on file.    Family history:  No family history on file.    Medications:  Current Outpatient Medications   Medication Sig Dispense Refill    ATORVASTATIN CALCIUM PO       calcium carbonate (TUMS) 500 MG chewable tablet Take 1 chew tab by mouth daily      Ilvheyk-Zppab-Xhxcuxoy-TenofAF (GENVOYA PO)       LISINOPRIL PO       loperamide (IMODIUM) 2 MG capsule Take 2 mg by mouth daily      loratadine (CLARITIN) 10 MG tablet Take 10 mg by mouth daily      MIRTAZAPINE PO Take by mouth At Bedtime      Pseudoephedrine-APAP-DM (DAYQUIL OR)       RANITIDINE HCL PO        Allergies:  The patientis allergic to wound dressings and tegaderm alginate ag rope.    Social history:  Social History     Tobacco Use    Smoking status: Former     Packs/day: 2     Types: Cigarettes     Start date: 8/8/1984     Quit date: 5/8/2022     Years since quitting:  1.5    Smokeless tobacco: Never   Substance Use Topics    Alcohol use: Yes     Comment: 5-6 beers a week     Marital status: single.    Review of Systems:  There are no exam notes on file for this visit.     This procedure was performed under a collaborative agreement with Dr. Carlos Enrique Davis MD, Chief of Colon and Rectal Surgery, [facility].    Theresa Brand NP-C  Colon and Rectal Surgery  [facility]

## 2023-11-27 LAB
PATH REPORT.COMMENTS IMP SPEC: NORMAL
PATH REPORT.FINAL DX SPEC: NORMAL
PATH REPORT.GROSS SPEC: NORMAL
PATH REPORT.MICROSCOPIC SPEC OTHER STN: NORMAL
PATH REPORT.RELEVANT HX SPEC: NORMAL

## 2024-10-11 ENCOUNTER — TRANSCRIBE ORDERS (OUTPATIENT)
Dept: OTHER | Age: 59
End: 2024-10-11

## 2024-10-11 ENCOUNTER — TRANSFERRED RECORDS (OUTPATIENT)
Dept: HEALTH INFORMATION MANAGEMENT | Facility: CLINIC | Age: 59
End: 2024-10-11

## 2024-10-11 DIAGNOSIS — B20 HUMAN IMMUNODEFICIENCY VIRUS (HIV) DISEASE (H): Primary | ICD-10-CM

## 2024-10-14 ENCOUNTER — TELEPHONE (OUTPATIENT)
Dept: SURGERY | Facility: CLINIC | Age: 59
End: 2024-10-14

## 2024-10-14 NOTE — TELEPHONE ENCOUNTER
Patient confirmed scheduled appointment:  Date: 12/13/24  Time: 115 pm   Visit type: Microscopy  Provider: Theresa Narayan NP  Location: [facility]  Testing/imaging: N/a   Additional notes: HRA

## 2024-12-13 PROCEDURE — 88112 CYTOPATH CELL ENHANCE TECH: CPT | Mod: TC | Performed by: NURSE PRACTITIONER

## 2024-12-13 PROCEDURE — G0452 MOLECULAR PATHOLOGY INTERPR: HCPCS | Mod: 26 | Performed by: PATHOLOGY

## 2024-12-13 PROCEDURE — 87624 HPV HI-RISK TYP POOLED RSLT: CPT | Performed by: NURSE PRACTITIONER

## 2024-12-13 PROCEDURE — 88112 CYTOPATH CELL ENHANCE TECH: CPT | Mod: 26 | Performed by: PATHOLOGY

## 2025-02-27 ENCOUNTER — MYC MEDICAL ADVICE (OUTPATIENT)
Dept: SURGERY | Facility: CLINIC | Age: 60
End: 2025-02-27

## 2025-05-13 ENCOUNTER — TELEPHONE (OUTPATIENT)
Dept: SURGERY | Facility: CLINIC | Age: 60
End: 2025-05-13

## 2025-05-13 NOTE — TELEPHONE ENCOUNTER
Left Voicemail (1st Attempt) for the patient to call back and schedule the following:    Appointment type: Microscopy   Provider: Theresa Narayan NP   Return date: Next Available   Specialty phone number: [phone]  Additional appointment(s) needed: n/a  Additonal Notes: HRA

## (undated) RX ORDER — FERRIC SUBSULFATE 0.21 G/G
LIQUID TOPICAL
Status: DISPENSED
Start: 2023-11-24

## (undated) RX ORDER — LIDOCAINE HYDROCHLORIDE AND EPINEPHRINE 10; 10 MG/ML; UG/ML
INJECTION, SOLUTION INFILTRATION; PERINEURAL
Status: DISPENSED
Start: 2019-08-08

## (undated) RX ORDER — LIDOCAINE HYDROCHLORIDE 20 MG/ML
JELLY TOPICAL
Status: DISPENSED
Start: 2023-11-24

## (undated) RX ORDER — FERRIC SUBSULFATE 0.21 G/G
LIQUID TOPICAL
Status: DISPENSED
Start: 2022-07-15

## (undated) RX ORDER — FERRIC SUBSULFATE 0.21 G/G
LIQUID TOPICAL
Status: DISPENSED
Start: 2017-04-20

## (undated) RX ORDER — LIDOCAINE HYDROCHLORIDE AND EPINEPHRINE 10; 10 MG/ML; UG/ML
INJECTION, SOLUTION INFILTRATION; PERINEURAL
Status: DISPENSED
Start: 2017-04-20

## (undated) RX ORDER — ACETIC ACID 5 %
LIQUID (ML) MISCELLANEOUS
Status: DISPENSED
Start: 2017-04-20

## (undated) RX ORDER — LIDOCAINE HYDROCHLORIDE AND EPINEPHRINE 10; 10 MG/ML; UG/ML
INJECTION, SOLUTION INFILTRATION; PERINEURAL
Status: DISPENSED
Start: 2017-11-30

## (undated) RX ORDER — ACETIC ACID 5 %
LIQUID (ML) MISCELLANEOUS
Status: DISPENSED
Start: 2022-07-15

## (undated) RX ORDER — ACETIC ACID 5 %
LIQUID (ML) MISCELLANEOUS
Status: DISPENSED
Start: 2019-08-08

## (undated) RX ORDER — FERRIC SUBSULFATE 0.21 G/G
LIQUID TOPICAL
Status: DISPENSED
Start: 2019-08-08

## (undated) RX ORDER — FERRIC SUBSULFATE 0.21 G/G
LIQUID TOPICAL
Status: DISPENSED
Start: 2017-11-30

## (undated) RX ORDER — LIDOCAINE HYDROCHLORIDE AND EPINEPHRINE 10; 10 MG/ML; UG/ML
INJECTION, SOLUTION INFILTRATION; PERINEURAL
Status: DISPENSED
Start: 2022-07-15

## (undated) RX ORDER — ACETIC ACID 5 %
LIQUID (ML) MISCELLANEOUS
Status: DISPENSED
Start: 2023-11-24

## (undated) RX ORDER — ACETIC ACID 5 %
LIQUID (ML) MISCELLANEOUS
Status: DISPENSED
Start: 2017-11-30